# Patient Record
Sex: MALE | Race: WHITE
[De-identification: names, ages, dates, MRNs, and addresses within clinical notes are randomized per-mention and may not be internally consistent; named-entity substitution may affect disease eponyms.]

---

## 2014-08-05 VITALS — SYSTOLIC BLOOD PRESSURE: 128 MMHG | DIASTOLIC BLOOD PRESSURE: 94 MMHG

## 2018-01-25 ENCOUNTER — HOSPITAL ENCOUNTER (EMERGENCY)
Dept: HOSPITAL 44 - ED | Age: 33
Discharge: HOME | End: 2018-01-25
Payer: SELF-PAY

## 2018-01-25 DIAGNOSIS — H72.91: Primary | ICD-10-CM

## 2018-01-25 PROCEDURE — 99282 EMERGENCY DEPT VISIT SF MDM: CPT

## 2018-01-25 NOTE — ED PHYSICIAN DOCUMENTATION
General Adult





- HISTORIAN


Historian: patient





- HPI


Stated Complaint: right ear pain 


Chief Complaint: Ear Complaints


Onset: hours (2)


Timing: still present


Severity: mild


Further Comments: yes (He states he put a Qtip in right ear and now he has pain 

. No other complaints. No drainge)





- ROS


CONST: no problems


EYES/ENT: none


NEURO/PSYCH: denies: headache





- PAST HX


Past History: none


Other History: none


Surgeries/Procedures: none


Immunizations: referred to PCP


Allergies/Adverse Reactions: 


 Allergies











Allergy/AdvReac Type Severity Reaction Status Date / Time


 


ziprasidone HCl [From Geodon] Allergy Severe Anaphylaxis Verified 01/25/18 20:50


 


ziprasidone mesylate Allergy Severe Anaphylaxis Verified 01/25/18 20:50





[From Geodon]     














Home Medications: 


 Ambulatory Orders











 Medication  Instructions  Recorded


 


Melatonin [Melatonin] 3 mg PO HS 08/05/14


 


Buspirone HCl [BUSPAR] 10 mg PO DAILY 01/25/18


 


Lisinopril [Prinivil] 20 mg PO DAILY 01/25/18


 


Mirtazapine [Mirtazapine] 45 mg PO DAILY 01/25/18


 


Omeprazole [Prilosec] 20 mg PO DAILY 01/25/18














- SOCIAL HX


Smoking History: cigarettes


Alcohol Use: occasionally


Drug Use: none





- FAMILY HX


Family History: No





- VITAL SIGNS


Vital Signs: 





 Vital Signs











Temp Pulse Resp BP Pulse Ox


 


          128/94    


 


          08/05/14 19:00   














- REVIEWED ASSESSMENTS


Nursing Assessment  Reviewed: Yes


Vitals Reviewed: Yes





General Adult Physical Exam





- PHYSICAL EXAM


GENERAL APPEARANCE: no distress


EENT: eye inspection normal, other (right ear drum with reddened area and 

rupture . No drainge in canal )


RESPIRATORY: no resp distress


CVS: reg rate & rhythm, heart sounds normal


SKIN: warm/dry


EXTREMITIES: non-tender


NEURO: oriented X3





Discharge


Clincal Impression: 


Ear drum perforation


Qualifiers:


 Laterality: right Qualified Code(s): H72.91 - Unspecified perforation of 

tympanic membrane, right ear





Referrals: 


Chuy Qiu [Primary Care Provider] - 2 Days


Comments: 





Use ear drops as prescribed


Use cotton in ear when in cold and wind


Return for any significant drainage or fever


See PCP early next week  


Condition: Stable


Disposition: 01 HOME, SELF-CARE


Decision to Admit: NO


Date of Decison to Admit: 01/25/18


Decision Time: 20:45